# Patient Record
Sex: MALE | Race: WHITE | NOT HISPANIC OR LATINO | Employment: FULL TIME | URBAN - NONMETROPOLITAN AREA
[De-identification: names, ages, dates, MRNs, and addresses within clinical notes are randomized per-mention and may not be internally consistent; named-entity substitution may affect disease eponyms.]

---

## 2024-04-27 ENCOUNTER — OFFICE VISIT (OUTPATIENT)
Dept: PRIMARY CARE | Facility: CLINIC | Age: 24
End: 2024-04-27
Payer: COMMERCIAL

## 2024-04-27 VITALS
WEIGHT: 169 LBS | HEIGHT: 69 IN | BODY MASS INDEX: 25.03 KG/M2 | DIASTOLIC BLOOD PRESSURE: 70 MMHG | SYSTOLIC BLOOD PRESSURE: 136 MMHG | OXYGEN SATURATION: 98 % | HEART RATE: 86 BPM

## 2024-04-27 DIAGNOSIS — I10 HYPERTENSION, UNSPECIFIED TYPE: ICD-10-CM

## 2024-04-27 DIAGNOSIS — R00.2 PALPITATIONS: Primary | ICD-10-CM

## 2024-04-27 LAB
ALBUMIN SERPL BCP-MCNC: 4.6 G/DL (ref 3.4–5)
ALP SERPL-CCNC: 63 U/L (ref 33–120)
ALT SERPL W P-5'-P-CCNC: 24 U/L (ref 10–52)
ANION GAP SERPL CALC-SCNC: 14 MMOL/L (ref 10–20)
AST SERPL W P-5'-P-CCNC: 18 U/L (ref 9–39)
BASOPHILS # BLD AUTO: 0.03 X10*3/UL (ref 0–0.1)
BASOPHILS NFR BLD AUTO: 0.6 %
BILIRUB SERPL-MCNC: 0.5 MG/DL (ref 0–1.2)
BUN SERPL-MCNC: 14 MG/DL (ref 6–23)
CALCIUM SERPL-MCNC: 9.5 MG/DL (ref 8.6–10.3)
CHLORIDE SERPL-SCNC: 102 MMOL/L (ref 98–107)
CO2 SERPL-SCNC: 27 MMOL/L (ref 21–32)
CREAT SERPL-MCNC: 0.86 MG/DL (ref 0.5–1.3)
EGFRCR SERPLBLD CKD-EPI 2021: >90 ML/MIN/1.73M*2
EOSINOPHIL # BLD AUTO: 0.16 X10*3/UL (ref 0–0.7)
EOSINOPHIL NFR BLD AUTO: 3.1 %
ERYTHROCYTE [DISTWIDTH] IN BLOOD BY AUTOMATED COUNT: 12.1 % (ref 11.5–14.5)
GLUCOSE SERPL-MCNC: 94 MG/DL (ref 74–99)
HCT VFR BLD AUTO: 48.8 % (ref 41–52)
HGB BLD-MCNC: 16.3 G/DL (ref 13.5–17.5)
IMM GRANULOCYTES # BLD AUTO: 0.02 X10*3/UL (ref 0–0.7)
IMM GRANULOCYTES NFR BLD AUTO: 0.4 % (ref 0–0.9)
LYMPHOCYTES # BLD AUTO: 1.68 X10*3/UL (ref 1.2–4.8)
LYMPHOCYTES NFR BLD AUTO: 32.4 %
MCH RBC QN AUTO: 30.4 PG (ref 26–34)
MCHC RBC AUTO-ENTMCNC: 33.4 G/DL (ref 32–36)
MCV RBC AUTO: 91 FL (ref 80–100)
MONOCYTES # BLD AUTO: 0.42 X10*3/UL (ref 0.1–1)
MONOCYTES NFR BLD AUTO: 8.1 %
NEUTROPHILS # BLD AUTO: 2.87 X10*3/UL (ref 1.2–7.7)
NEUTROPHILS NFR BLD AUTO: 55.4 %
NRBC BLD-RTO: 0 /100 WBCS (ref 0–0)
PLATELET # BLD AUTO: 240 X10*3/UL (ref 150–450)
POTASSIUM SERPL-SCNC: 4.5 MMOL/L (ref 3.5–5.3)
PROT SERPL-MCNC: 6.8 G/DL (ref 6.4–8.2)
RBC # BLD AUTO: 5.36 X10*6/UL (ref 4.5–5.9)
SODIUM SERPL-SCNC: 138 MMOL/L (ref 136–145)
TSH SERPL-ACNC: 0.87 MIU/L (ref 0.44–3.98)
WBC # BLD AUTO: 5.2 X10*3/UL (ref 4.4–11.3)

## 2024-04-27 PROCEDURE — 85025 COMPLETE CBC W/AUTO DIFF WBC: CPT

## 2024-04-27 PROCEDURE — 84443 ASSAY THYROID STIM HORMONE: CPT

## 2024-04-27 PROCEDURE — 3078F DIAST BP <80 MM HG: CPT

## 2024-04-27 PROCEDURE — 3075F SYST BP GE 130 - 139MM HG: CPT

## 2024-04-27 PROCEDURE — 99213 OFFICE O/P EST LOW 20 MIN: CPT

## 2024-04-27 PROCEDURE — 36415 COLL VENOUS BLD VENIPUNCTURE: CPT

## 2024-04-27 PROCEDURE — 80053 COMPREHEN METABOLIC PANEL: CPT

## 2024-04-27 PROCEDURE — 93000 ELECTROCARDIOGRAM COMPLETE: CPT

## 2024-04-27 ASSESSMENT — PATIENT HEALTH QUESTIONNAIRE - PHQ9
2. FEELING DOWN, DEPRESSED OR HOPELESS: NOT AT ALL
1. LITTLE INTEREST OR PLEASURE IN DOING THINGS: NOT AT ALL
SUM OF ALL RESPONSES TO PHQ9 QUESTIONS 1 AND 2: 0

## 2024-04-27 NOTE — PROGRESS NOTES
Subjective   Patient ID: Karthik Rowell is a 24 y.o. male who presents for Hypertension (Heart racing).  HPI  Karthik presents with his wife for high BP concerns and heart racing that started the beginning of this year.  Happens more during the evening when he is done working is when he notices his heart racing. This does not happen every night.  No chest pain, but sometimes SOB. No syncope.    He checked his BP a couple weeks ago - it was 150/100.  No lightheadedness or dizziness at this time.   But he was SOB and felt heart racing.  No one in his family has high blood pressure.  Karthik does drink 1 energy drink a day and also 3 cups of coffee.   He does eat a lot of salty foods.    No lower leg swelling.  No urinary concerns.  No weight changes.  No constipation or diarrhea.  No increase in anxiety.    Grandma has thyroid issues - unsure which kind.  Grandfather had a heart attack.  Dad has DM      Past Surgical History:   Procedure Laterality Date    OTHER SURGICAL HISTORY  10/27/2017    Prior Surgical Procedure Not Done      Past Medical History:   Diagnosis Date    Displaced fracture of distal phalanx of left middle finger, initial encounter for open fracture 11/13/2019    Open displaced fracture of distal phalanx of left middle finger, initial encounter    Laceration without foreign body of left middle finger with damage to nail, initial encounter 11/13/2019    Laceration of left middle finger without foreign body with damage to nail, initial encounter    Laceration without foreign body of nose, initial encounter 03/26/2020    Laceration of nose    Other acute sinusitis 10/27/2017    Other acute sinusitis, recurrence not specified    Other conditions influencing health status 01/28/2018    Paronychia, acute    Other specified health status     Known health problems: none    Pain in left wrist 04/01/2020    Left wrist pain    Pain in right knee 03/26/2020    Right knee pain    Personal history of diseases of the skin  "and subcutaneous tissue 08/20/2013    History of contact dermatitis    Personal history of other diseases of the respiratory system 03/11/2020    History of pharyngitis     Social History     Tobacco Use    Smoking status: Some Days     Types: Cigarettes    Smokeless tobacco: Never        Review of Systems  10 point review of systems performed and is negative except as noted in the HPI.    No current outpatient medications on file.     Objective   /70   Pulse 86   Ht 1.753 m (5' 9\")   Wt 76.7 kg (169 lb)   SpO2 98%   BMI 24.96 kg/m²     Physical Exam  Constitutional:       General: He is not in acute distress.     Appearance: Normal appearance. He is not ill-appearing or toxic-appearing.   HENT:      Head: Normocephalic and atraumatic.      Right Ear: Tympanic membrane, ear canal and external ear normal.      Left Ear: Tympanic membrane, ear canal and external ear normal.      Nose: Nose normal. No congestion or rhinorrhea.      Mouth/Throat:      Mouth: Mucous membranes are moist.      Pharynx: Oropharynx is clear. No oropharyngeal exudate or posterior oropharyngeal erythema.   Eyes:      Conjunctiva/sclera: Conjunctivae normal.      Pupils: Pupils are equal, round, and reactive to light.   Neck:      Vascular: No carotid bruit.   Cardiovascular:      Rate and Rhythm: Normal rate and regular rhythm.      Pulses: Normal pulses.      Heart sounds: Normal heart sounds. No murmur heard.  Pulmonary:      Effort: Pulmonary effort is normal.      Breath sounds: Normal breath sounds. No wheezing, rhonchi or rales.   Abdominal:      General: Bowel sounds are normal. There is no distension.      Palpations: Abdomen is soft. There is no mass.      Tenderness: There is no abdominal tenderness. There is no guarding or rebound.   Musculoskeletal:         General: Normal range of motion.      Cervical back: Normal range of motion and neck supple. No tenderness.      Right lower leg: No edema.      Left lower leg: No " edema.   Lymphadenopathy:      Cervical: No cervical adenopathy.   Skin:     General: Skin is warm and dry.      Findings: No rash.   Neurological:      Mental Status: He is alert and oriented to person, place, and time.   Psychiatric:         Mood and Affect: Mood normal.         Behavior: Behavior normal.         Assessment/Plan   Problem List Items Addressed This Visit    None  Visit Diagnoses       Palpitations    -  Primary    Relevant Orders    TSH with reflex to Free T4 if abnormal (Completed)    ECG 12 Lead (Completed)    Hypertension, unspecified type        Relevant Orders    CBC and Auto Differential (Completed)    Comprehensive Metabolic Panel (Completed)    TSH with reflex to Free T4 if abnormal (Completed)          Palpitations   ECG - normal   TSH  Discussed decreasing caffeine - no energy drinks, cutting down his coffee consumption - see if this helps  If no improvement then zio    HTN   TSH, CMP, CBC  Also discussed decreasing caffeine may help with this  If BP continues to be elevated then need renal vascular ultrasound     Discussed at visit any disease processes that were of concern as well as the risks, benefits and instructions on any new medication provided. Patient (and/or caretaker of patient if present) stated all questions were answered, and they voiced understanding of instructions.

## 2024-05-17 ENCOUNTER — OFFICE VISIT (OUTPATIENT)
Dept: PRIMARY CARE | Facility: CLINIC | Age: 24
End: 2024-05-17
Payer: COMMERCIAL

## 2024-05-17 VITALS
SYSTOLIC BLOOD PRESSURE: 126 MMHG | BODY MASS INDEX: 24.93 KG/M2 | DIASTOLIC BLOOD PRESSURE: 72 MMHG | HEART RATE: 72 BPM | OXYGEN SATURATION: 98 % | WEIGHT: 168.8 LBS

## 2024-05-17 DIAGNOSIS — Z78.9 EXCESSIVE CAFFEINE INTAKE: Primary | ICD-10-CM

## 2024-05-17 PROCEDURE — 99212 OFFICE O/P EST SF 10 MIN: CPT

## 2024-05-17 NOTE — PROGRESS NOTES
Subjective   Patient ID: Karthik Rowell is a 24 y.o. male who presents for Follow-up.  HPI  Karthik is following up for heart racing and elevated BP  Last visit 4/27/24    Heart racing is better  Cut back on energy drinks and caffeine - drinks 1-2 cups a day of coffee   Has only had 1 energy drink in the past 2 weeks  No headaches     Checking BP   -140 was high reading once   -120 other times     No lightheadedness no SOB   Heart does not race anymore at night   No chest pain   Overall feels much better    Past Surgical History:   Procedure Laterality Date    OTHER SURGICAL HISTORY  10/27/2017    Prior Surgical Procedure Not Done      Past Medical History:   Diagnosis Date    Displaced fracture of distal phalanx of left middle finger, initial encounter for open fracture 11/13/2019    Open displaced fracture of distal phalanx of left middle finger, initial encounter    Laceration without foreign body of left middle finger with damage to nail, initial encounter 11/13/2019    Laceration of left middle finger without foreign body with damage to nail, initial encounter    Laceration without foreign body of nose, initial encounter 03/26/2020    Laceration of nose    Other acute sinusitis 10/27/2017    Other acute sinusitis, recurrence not specified    Other conditions influencing health status 01/28/2018    Paronychia, acute    Other specified health status     Known health problems: none    Pain in left wrist 04/01/2020    Left wrist pain    Pain in right knee 03/26/2020    Right knee pain    Personal history of diseases of the skin and subcutaneous tissue 08/20/2013    History of contact dermatitis    Personal history of other diseases of the respiratory system 03/11/2020    History of pharyngitis     Social History     Tobacco Use    Smoking status: Some Days     Types: Cigarettes    Smokeless tobacco: Never        Review of Systems  10 point review of systems performed and is negative except as noted in the HPI.    No  current outpatient medications on file.     Objective   /72   Pulse 72   Wt 76.6 kg (168 lb 12.8 oz)   SpO2 98%   BMI 24.93 kg/m²     Physical Exam  Constitutional:       General: He is not in acute distress.     Appearance: Normal appearance. He is not ill-appearing.   HENT:      Head: Normocephalic and atraumatic.   Cardiovascular:      Rate and Rhythm: Normal rate and regular rhythm.      Heart sounds: Normal heart sounds. No murmur heard.  Pulmonary:      Effort: Pulmonary effort is normal. No respiratory distress.      Breath sounds: Normal breath sounds. No wheezing, rhonchi or rales.   Neurological:      Mental Status: He is alert and oriented to person, place, and time.   Psychiatric:         Mood and Affect: Mood normal.         Behavior: Behavior normal.         Assessment/Plan   Problem List Items Addressed This Visit    None  Visit Diagnoses       Excessive caffeine intake    -  Primary          Discussed that the high BP readings and heart racing were likely due to his high caffeine intake  Labs were normal, ecg was normal   Karthik has cut back on his caffeine significantly and is feeling much better  Also recommend he increase fluids   Discussed if he does develop high BP again then we need to US his kidneys but at this time seems to be much improved     Discussed at visit any disease processes that were of concern as well as the risks, benefits and instructions on any new medication provided. Patient (and/or caretaker of patient if present) stated all questions were answered, and they voiced understanding of instructions.

## 2025-06-24 DIAGNOSIS — L23.7 POISON IVY: Primary | ICD-10-CM

## 2025-06-24 RX ORDER — TRIAMCINOLONE ACETONIDE 1 MG/G
CREAM TOPICAL 2 TIMES DAILY
Qty: 30 G | Refills: 0 | Status: SHIPPED | OUTPATIENT
Start: 2025-06-24